# Patient Record
Sex: FEMALE | Race: WHITE | NOT HISPANIC OR LATINO | Employment: OTHER | URBAN - METROPOLITAN AREA
[De-identification: names, ages, dates, MRNs, and addresses within clinical notes are randomized per-mention and may not be internally consistent; named-entity substitution may affect disease eponyms.]

---

## 2022-06-14 ENCOUNTER — HOSPITAL ENCOUNTER (OUTPATIENT)
Facility: HOSPITAL | Age: 77
Setting detail: OBSERVATION
Discharge: HOME | End: 2022-06-16
Attending: EMERGENCY MEDICINE | Admitting: EMERGENCY MEDICINE
Payer: MEDICARE

## 2022-06-14 DIAGNOSIS — E87.1 HYPONATREMIA: ICD-10-CM

## 2022-06-14 DIAGNOSIS — E83.51 HYPOCALCEMIA: ICD-10-CM

## 2022-06-14 DIAGNOSIS — R42 VERTIGO: ICD-10-CM

## 2022-06-14 PROCEDURE — 99285 EMERGENCY DEPT VISIT HI MDM: CPT | Mod: 25

## 2022-06-14 PROCEDURE — 96375 TX/PRO/DX INJ NEW DRUG ADDON: CPT

## 2022-06-14 PROCEDURE — C9803 HOPD COVID-19 SPEC COLLECT: HCPCS

## 2022-06-14 PROCEDURE — 96361 HYDRATE IV INFUSION ADD-ON: CPT

## 2022-06-14 PROCEDURE — 96374 THER/PROPH/DIAG INJ IV PUSH: CPT

## 2022-06-15 ENCOUNTER — APPOINTMENT (OUTPATIENT)
Dept: MRI IMAGING | Facility: HOSPITAL | Age: 77
End: 2022-06-15
Attending: EMERGENCY MEDICINE
Payer: MEDICARE

## 2022-06-15 ENCOUNTER — APPOINTMENT (OUTPATIENT)
Dept: PHYSICAL THERAPY | Facility: HOSPITAL | Age: 77
End: 2022-06-15
Attending: EMERGENCY MEDICINE
Payer: MEDICARE

## 2022-06-15 PROBLEM — E87.1 HYPONATREMIA: Status: ACTIVE | Noted: 2022-06-15

## 2022-06-15 PROBLEM — E83.51 HYPOCALCEMIA: Status: ACTIVE | Noted: 2022-06-15

## 2022-06-15 PROBLEM — R42 VERTIGO: Status: ACTIVE | Noted: 2022-06-15

## 2022-06-15 LAB
ANION GAP SERPL CALCULATED.3IONS-SCNC: 4 MMOL/L (ref 5–18)
ANION GAP SERPL CALCULATED.3IONS-SCNC: 9 MMOL/L (ref 5–18)
ATRIAL RATE - MUSE: 67 BPM
BUN SERPL-MCNC: 10 MG/DL (ref 8–28)
BUN SERPL-MCNC: 11 MG/DL (ref 8–28)
CALCIUM SERPL-MCNC: 8.3 MG/DL (ref 8.5–10.5)
CALCIUM SERPL-MCNC: 9.4 MG/DL (ref 8.5–10.5)
CHLORIDE BLD-SCNC: 100 MMOL/L (ref 98–107)
CHLORIDE BLD-SCNC: 95 MMOL/L (ref 98–107)
CO2 SERPL-SCNC: 25 MMOL/L (ref 22–31)
CO2 SERPL-SCNC: 29 MMOL/L (ref 22–31)
CREAT SERPL-MCNC: 0.67 MG/DL (ref 0.6–1.1)
CREAT SERPL-MCNC: 0.7 MG/DL (ref 0.6–1.1)
DIASTOLIC BLOOD PRESSURE - MUSE: 88 MMHG
ERYTHROCYTE [DISTWIDTH] IN BLOOD BY AUTOMATED COUNT: 12 % (ref 10–15)
GFR SERPL CREATININE-BSD FRML MDRD: 89 ML/MIN/1.73M2
GFR SERPL CREATININE-BSD FRML MDRD: 90 ML/MIN/1.73M2
GLUCOSE BLD-MCNC: 101 MG/DL (ref 70–125)
GLUCOSE BLD-MCNC: 133 MG/DL (ref 70–125)
HCT VFR BLD AUTO: 39.3 % (ref 35–47)
HGB BLD-MCNC: 13.4 G/DL (ref 11.7–15.7)
INTERPRETATION ECG - MUSE: NORMAL
MAGNESIUM SERPL-MCNC: 1.8 MG/DL (ref 1.8–2.6)
MCH RBC QN AUTO: 29.8 PG (ref 26.5–33)
MCHC RBC AUTO-ENTMCNC: 34.1 G/DL (ref 31.5–36.5)
MCV RBC AUTO: 87 FL (ref 78–100)
P AXIS - MUSE: 63 DEGREES
PLATELET # BLD AUTO: 220 10E3/UL (ref 150–450)
POTASSIUM BLD-SCNC: 3.9 MMOL/L (ref 3.5–5)
POTASSIUM BLD-SCNC: 3.9 MMOL/L (ref 3.5–5)
PR INTERVAL - MUSE: 148 MS
QRS DURATION - MUSE: 78 MS
QT - MUSE: 430 MS
QTC - MUSE: 454 MS
R AXIS - MUSE: 66 DEGREES
RBC # BLD AUTO: 4.5 10E6/UL (ref 3.8–5.2)
SARS-COV-2 RNA RESP QL NAA+PROBE: NEGATIVE
SODIUM SERPL-SCNC: 129 MMOL/L (ref 136–145)
SODIUM SERPL-SCNC: 133 MMOL/L (ref 136–145)
SYSTOLIC BLOOD PRESSURE - MUSE: 157 MMHG
T AXIS - MUSE: 63 DEGREES
VENTRICULAR RATE- MUSE: 67 BPM
WBC # BLD AUTO: 6.7 10E3/UL (ref 4–11)

## 2022-06-15 PROCEDURE — 87635 SARS-COV-2 COVID-19 AMP PRB: CPT | Performed by: EMERGENCY MEDICINE

## 2022-06-15 PROCEDURE — 258N000003 HC RX IP 258 OP 636: Performed by: EMERGENCY MEDICINE

## 2022-06-15 PROCEDURE — 36415 COLL VENOUS BLD VENIPUNCTURE: CPT | Performed by: EMERGENCY MEDICINE

## 2022-06-15 PROCEDURE — 97116 GAIT TRAINING THERAPY: CPT | Mod: GP

## 2022-06-15 PROCEDURE — 80048 BASIC METABOLIC PNL TOTAL CA: CPT | Performed by: EMERGENCY MEDICINE

## 2022-06-15 PROCEDURE — 250N000013 HC RX MED GY IP 250 OP 250 PS 637: Performed by: EMERGENCY MEDICINE

## 2022-06-15 PROCEDURE — 93005 ELECTROCARDIOGRAM TRACING: CPT | Performed by: EMERGENCY MEDICINE

## 2022-06-15 PROCEDURE — 250N000011 HC RX IP 250 OP 636: Performed by: EMERGENCY MEDICINE

## 2022-06-15 PROCEDURE — 97530 THERAPEUTIC ACTIVITIES: CPT | Mod: GP

## 2022-06-15 PROCEDURE — G0378 HOSPITAL OBSERVATION PER HR: HCPCS

## 2022-06-15 PROCEDURE — 97162 PT EVAL MOD COMPLEX 30 MIN: CPT | Mod: GP

## 2022-06-15 PROCEDURE — 96372 THER/PROPH/DIAG INJ SC/IM: CPT | Performed by: EMERGENCY MEDICINE

## 2022-06-15 PROCEDURE — 83735 ASSAY OF MAGNESIUM: CPT | Performed by: EMERGENCY MEDICINE

## 2022-06-15 PROCEDURE — 70551 MRI BRAIN STEM W/O DYE: CPT

## 2022-06-15 PROCEDURE — 99220 PR INITIAL OBSERVATION CARE,LEVEL III: CPT | Performed by: EMERGENCY MEDICINE

## 2022-06-15 PROCEDURE — 85027 COMPLETE CBC AUTOMATED: CPT | Performed by: EMERGENCY MEDICINE

## 2022-06-15 RX ORDER — POLYETHYLENE GLYCOL 3350 17 G/17G
1 POWDER, FOR SOLUTION ORAL DAILY
COMMUNITY

## 2022-06-15 RX ORDER — AMOXICILLIN 250 MG
1 CAPSULE ORAL 2 TIMES DAILY PRN
Status: DISCONTINUED | OUTPATIENT
Start: 2022-06-15 | End: 2022-06-16 | Stop reason: HOSPADM

## 2022-06-15 RX ORDER — ENOXAPARIN SODIUM 100 MG/ML
40 INJECTION SUBCUTANEOUS EVERY 24 HOURS
Status: DISCONTINUED | OUTPATIENT
Start: 2022-06-15 | End: 2022-06-16 | Stop reason: HOSPADM

## 2022-06-15 RX ORDER — LIDOCAINE 40 MG/G
CREAM TOPICAL
Status: DISCONTINUED | OUTPATIENT
Start: 2022-06-15 | End: 2022-06-16 | Stop reason: HOSPADM

## 2022-06-15 RX ORDER — ONDANSETRON 2 MG/ML
4 INJECTION INTRAMUSCULAR; INTRAVENOUS EVERY 6 HOURS PRN
Status: DISCONTINUED | OUTPATIENT
Start: 2022-06-15 | End: 2022-06-16 | Stop reason: HOSPADM

## 2022-06-15 RX ORDER — ATORVASTATIN CALCIUM 40 MG/1
40 TABLET, FILM COATED ORAL AT BEDTIME
Status: DISCONTINUED | OUTPATIENT
Start: 2022-06-15 | End: 2022-06-16 | Stop reason: HOSPADM

## 2022-06-15 RX ORDER — BUSPIRONE HYDROCHLORIDE 5 MG/1
10 TABLET ORAL 2 TIMES DAILY
COMMUNITY

## 2022-06-15 RX ORDER — ONDANSETRON 4 MG/1
4 TABLET, ORALLY DISINTEGRATING ORAL EVERY 6 HOURS PRN
Status: DISCONTINUED | OUTPATIENT
Start: 2022-06-15 | End: 2022-06-16 | Stop reason: HOSPADM

## 2022-06-15 RX ORDER — CALCIUM CARBONATE 500(1250)
500 TABLET ORAL ONCE
Status: COMPLETED | OUTPATIENT
Start: 2022-06-15 | End: 2022-06-15

## 2022-06-15 RX ORDER — SODIUM CHLORIDE 9 MG/ML
INJECTION, SOLUTION INTRAVENOUS CONTINUOUS
Status: ACTIVE | OUTPATIENT
Start: 2022-06-15 | End: 2022-06-15

## 2022-06-15 RX ORDER — POLYETHYLENE GLYCOL 3350 17 G/17G
17 POWDER, FOR SOLUTION ORAL EVERY EVENING
Status: DISCONTINUED | OUTPATIENT
Start: 2022-06-15 | End: 2022-06-16 | Stop reason: HOSPADM

## 2022-06-15 RX ORDER — ACETAMINOPHEN 325 MG/1
650 TABLET ORAL EVERY 6 HOURS PRN
COMMUNITY

## 2022-06-15 RX ORDER — MECLIZINE HYDROCHLORIDE 25 MG/1
25 TABLET ORAL EVERY 6 HOURS PRN
Status: DISCONTINUED | OUTPATIENT
Start: 2022-06-15 | End: 2022-06-16 | Stop reason: HOSPADM

## 2022-06-15 RX ORDER — BUSPIRONE HYDROCHLORIDE 10 MG/1
10 TABLET ORAL 2 TIMES DAILY
Status: DISCONTINUED | OUTPATIENT
Start: 2022-06-15 | End: 2022-06-16 | Stop reason: HOSPADM

## 2022-06-15 RX ORDER — ATORVASTATIN CALCIUM 40 MG/1
40 TABLET, FILM COATED ORAL AT BEDTIME
COMMUNITY

## 2022-06-15 RX ORDER — AMOXICILLIN 250 MG
2 CAPSULE ORAL 2 TIMES DAILY PRN
Status: DISCONTINUED | OUTPATIENT
Start: 2022-06-15 | End: 2022-06-16 | Stop reason: HOSPADM

## 2022-06-15 RX ORDER — SERTRALINE HYDROCHLORIDE 100 MG/1
200 TABLET, FILM COATED ORAL DAILY
COMMUNITY

## 2022-06-15 RX ORDER — MECLIZINE HCL 12.5 MG 12.5 MG/1
25 TABLET ORAL ONCE
Status: DISCONTINUED | OUTPATIENT
Start: 2022-06-15 | End: 2022-06-15

## 2022-06-15 RX ORDER — ONDANSETRON 2 MG/ML
4 INJECTION INTRAMUSCULAR; INTRAVENOUS ONCE
Status: COMPLETED | OUTPATIENT
Start: 2022-06-15 | End: 2022-06-15

## 2022-06-15 RX ORDER — DIAZEPAM 10 MG/2ML
2.5 INJECTION, SOLUTION INTRAMUSCULAR; INTRAVENOUS ONCE
Status: COMPLETED | OUTPATIENT
Start: 2022-06-15 | End: 2022-06-15

## 2022-06-15 RX ORDER — ACETAMINOPHEN 325 MG/1
650 TABLET ORAL EVERY 6 HOURS PRN
Status: DISCONTINUED | OUTPATIENT
Start: 2022-06-15 | End: 2022-06-16 | Stop reason: HOSPADM

## 2022-06-15 RX ORDER — MECLIZINE HCL 12.5 MG 12.5 MG/1
25 TABLET ORAL ONCE
Status: COMPLETED | OUTPATIENT
Start: 2022-06-15 | End: 2022-06-15

## 2022-06-15 RX ORDER — DIPHENHYDRAMINE HYDROCHLORIDE 50 MG/ML
25 INJECTION INTRAMUSCULAR; INTRAVENOUS ONCE
Status: COMPLETED | OUTPATIENT
Start: 2022-06-15 | End: 2022-06-15

## 2022-06-15 RX ADMIN — BUSPIRONE HYDROCHLORIDE 10 MG: 10 TABLET ORAL at 16:05

## 2022-06-15 RX ADMIN — ENOXAPARIN SODIUM 40 MG: 40 INJECTION SUBCUTANEOUS at 08:26

## 2022-06-15 RX ADMIN — DIAZEPAM 2.5 MG: 5 INJECTION, SOLUTION INTRAMUSCULAR; INTRAVENOUS at 00:11

## 2022-06-15 RX ADMIN — SODIUM CHLORIDE: 9 INJECTION, SOLUTION INTRAVENOUS at 08:32

## 2022-06-15 RX ADMIN — SERTRALINE HYDROCHLORIDE 200 MG: 100 TABLET ORAL at 16:05

## 2022-06-15 RX ADMIN — ACETAMINOPHEN 650 MG: 325 TABLET, FILM COATED ORAL at 22:15

## 2022-06-15 RX ADMIN — MECLIZINE 25 MG: 12.5 TABLET ORAL at 00:12

## 2022-06-15 RX ADMIN — PROMETHAZINE HYDROCHLORIDE 12.5 MG: 25 INJECTION INTRAMUSCULAR; INTRAVENOUS at 02:14

## 2022-06-15 RX ADMIN — SODIUM CHLORIDE 500 ML: 9 INJECTION, SOLUTION INTRAVENOUS at 04:22

## 2022-06-15 RX ADMIN — DIPHENHYDRAMINE HYDROCHLORIDE 25 MG: 50 INJECTION, SOLUTION INTRAMUSCULAR; INTRAVENOUS at 01:29

## 2022-06-15 RX ADMIN — Medication 500 MG: at 02:51

## 2022-06-15 RX ADMIN — BUSPIRONE HYDROCHLORIDE 10 MG: 10 TABLET ORAL at 21:47

## 2022-06-15 RX ADMIN — SODIUM CHLORIDE: 9 INJECTION, SOLUTION INTRAVENOUS at 18:55

## 2022-06-15 RX ADMIN — ATORVASTATIN CALCIUM 40 MG: 40 TABLET, FILM COATED ORAL at 21:47

## 2022-06-15 RX ADMIN — ONDANSETRON 4 MG: 2 INJECTION INTRAMUSCULAR; INTRAVENOUS at 00:12

## 2022-06-15 ASSESSMENT — ACTIVITIES OF DAILY LIVING (ADL)
ADLS_ACUITY_SCORE: 35

## 2022-06-15 ASSESSMENT — ENCOUNTER SYMPTOMS
NAUSEA: 1
DIZZINESS: 1
WEAKNESS: 0
VOMITING: 1

## 2022-06-15 NOTE — ED PROVIDER NOTES
EMERGENCY DEPARTMENT ENCOUNTER      NAME: Carisa Rosado  AGE: 77 year old female  YOB: 1945  MRN: [id number]  EVALUATION DATE & TIME: 6/14/2022 11:47 PM    PCP: Izzy Corado    ED PROVIDER: Nesha Lui M.D.      Chief Complaint   Patient presents with     Nausea     d     Dizziness         FINAL IMPRESSION:  1. Vertigo    2. Hyponatremia    3. Hypocalcemia        MEDICAL DECISION MAKING:    Pertinent Labs & Imaging studies reviewed. (See chart for details)  ED Course as of 06/15/22 0453   Wed Caleb 15, 2022   0002 Afebrile.  Vital signs here with mild hypertension, otherwise within normal limits.  Patient is presenting with vertigo.  Started 6 PM.  She states she does have a history of this in the past.  She states this episode feels identical to her previous episodes of vertigo.  Normally they are self-limiting, today it seems to have persisted over the last 6 hours.  She does not have any symptoms besides some mild nausea if she is laying straight.  Her symptoms are completely reproducible with any movement of her head.  She does have some seasonal allergies but does not take medications for it.  Has not been ill with anything lately.  No weakness or numbness in her arms or legs.  No falls or headache.  No other symptoms.    Physical exam for patient here she does have 3 beat nystagmus with far left vertical gaze.  She does not have any reproduction of vertigo with just movement of her eyes.  Turning her head to the right or left does elicit vertiginous symptoms with associated nausea.  Otherwise she does have some mild fluid located behind her bilateral tympanic membranes, slightly worse on the left than the right.  No sinus pressure.  Otherwise neurologic leave she is intact without any focal findings on neurologic exam.    This does seem consistent with peripheral vertigo given that the symptoms are worsened with movement and turning of her head from side to side.  She does have some  nystagmus as well.  Negative test of skew.  She only has unilateral nystagmus.  At this point we will treat symptomatically to see if she has improvement with just meclizine and Zofran.  Regardless her symptoms been ongoing for the last 6 hours, does not appear by exam to be completely peripheral.  Will reevaluate after medications.   0120 Patient still with persistent vertigo.  Blood pressures come down, they reported her respiration rate is elevated however this is not, the case.  She is still feeling nauseated.  To try to get up to go to the bathroom and still feeling very dizzy despite medications given an hour ago.  Still does seem to be positional.  However given the persistence despite medications we will proceed with MRI, check basic labs for patient here as well.  We will give an additional 25 of meclizine.   0308 Labs back here with slight hyponatremia, calcium is low, given some fluids, will replenish calcium.  Still awaiting MRI.  Patient resting at this time.       MRI here unremarkable.  Patient has been able to get up and walk to the bathroom however she states she still feels vertiginous.  She is not reporting any improvement in her symptoms despite multiple doses of medications.  She does appear to be tired here but is still complaining of symptoms.  Still has occasional nausea.  Is gotten multiple IV and oral medications as well as multiple antinausea medications.  At this point we will admit patient for ongoing vertigo.      Critical care: 0 minutes excluding separately billable procedures.  Includes bedside management, time reviewing test results, review of records, discussing the case with staff, documenting the medical record and time spent with family members (or surrogate decision makers) discussing specific treatment issues.        ED COURSE:  12:00 AM I met with the patient, obtained history, performed an initial exam, and discussed options and plan for diagnostics and treatment here in the  ED.  1:17 AM I rechecked patient. Patient is still feeling dizzy.    The importance of close follow up was discussed. We reviewed warning signs and symptoms, and I instructed Ms. Rosado to return to the emergency department immediately if she develops any new or worsening symptoms. I provided additional verbal discharge instructions. Ms. Rosado expressed understanding and agreement with this plan of care, her questions were answered, and she was discharged in stable condition.     MEDICATIONS GIVEN IN THE EMERGENCY:  Medications   promethazine (PHENERGAN) 12.5 mg in sodium chloride 0.9 % 50 mL intermittent infusion (12.5 mg Intravenous Given 6/15/22 0214)   meclizine (ANTIVERT) tablet 25 mg (25 mg Oral Given 6/15/22 0012)   diazepam (VALIUM) injection 2.5 mg (2.5 mg Intravenous Given 6/15/22 0011)   ondansetron (ZOFRAN) injection 4 mg (4 mg Intravenous Given 6/15/22 0012)   diphenhydrAMINE (BENADRYL) injection 25 mg (25 mg Intravenous Given 6/15/22 0129)   0.9% sodium chloride BOLUS (500 mLs Intravenous New Bag 6/15/22 0422)   calcium carbonate 500 mg (elemental) (OSCAL) tablet 500 mg (500 mg Oral Given 6/15/22 0251)       NEW PRESCRIPTIONS STARTED AT TODAY'S ER VISIT:  New Prescriptions    No medications on file          =================================================================    HPI    Patient information was obtained from: Patient    Use of : N/A         Carisa Rosado is a 77 year old female with a history of hypercholesterolemia, chronic GERD, gastroenteritis, BCC, who presents to the ED via EMS for the evaluation of nausea and dizziness.    Patient reports nausea and dizziness when getting up starting at 1800 last night. She states that when she is lying down with her eyes closed, she does not have any dizziness but does feel nauseous. Patient reports a history of vertigo and states that current symptoms are reminiscent. Otherwise, she denies any ear pain, ear congestion, and weakness.  Patient does not follow with ENT. No other complaints at this time.    Per triage note, patient vomited twice. Patient was given IV fluids and 4 mg IV Zofran per EMS.     REVIEW OF SYSTEMS   Review of Systems   HENT: Negative for ear pain.         Negative for congestion in ears   Gastrointestinal: Positive for nausea and vomiting.   Neurological: Positive for dizziness. Negative for weakness.   All other systems reviewed and are negative.    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       PAST SURGICAL HISTORY:  Past Surgical History:   Procedure Laterality Date     COLONOSCOPY N/A 10/27/2014    Procedure: COLONOSCOPY;  Surgeon: Mark Amaral MD;  Location: [facility]     GYN SURGERY       HYSTERECTOMY TOTAL ABDOMINAL       TUBAL LIGATION         CURRENT MEDICATIONS:      Current Facility-Administered Medications:      promethazine (PHENERGAN) 12.5 mg in sodium chloride 0.9 % 50 mL intermittent infusion, 12.5 mg, Intravenous, Q6H PRN, Oralia Lui MD, 12.5 mg at 06/15/22 0214    Current Outpatient Medications:      ASPIRIN 81 MG OR TABS, 1 TABLET DAILY, Disp: 30, Rfl: 0     CALCIUM-CARB 600 + D OR, 1 TABLET ORALLY DAILY, Disp: , Rfl:      Citalopram Hydrobromide (CELEXA PO), Take 40 mg by mouth daily , Disp: , Rfl:      Ranitidine HCl (ZANTAC PO), Take 150 mg by mouth daily , Disp: , Rfl:      Simvastatin (ZOCOR PO), Take 20 mg by mouth daily , Disp: , Rfl:     ALLERGIES:  Allergies   Allergen Reactions     Pcn [Penicillins] Hives       FAMILY HISTORY:  Family History   Problem Relation Age of Onset     Cancer Mother      Breast Cancer Mother      Cancer Father         lung     Neurologic Disorder Maternal Grandmother         parkinsons     Depression Sister      Alcohol/Drug Son      Depression Son      Respiratory  "Daughter         asthma     Alcohol/Drug Daughter      Depression Daughter      Alcohol/Drug Daughter      Depression Daughter      Neurologic Disorder Other         epilepsy       SOCIAL HISTORY:   Social History     Socioeconomic History     Marital status:      Spouse name: None     Number of children: None     Years of education: None     Highest education level: None   Tobacco Use     Smoking status: Former Smoker     Quit date: 3/14/1970     Years since quittin.2     Smokeless tobacco: Never Used   Substance and Sexual Activity     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     Drug use: No     Sexual activity: Yes     Partners: Male       PHYSICAL EXAM:    Vitals: BP (!) 141/69   Pulse 67   Temp 98  F (36.7  C)   Resp 16   Ht 1.575 m (5' 2\")   Wt 53.1 kg (117 lb)   SpO2 100%   BMI 21.40 kg/m     General:. Alert and interactive, comfortable appearing.  HENT: Oropharynx without erythema or exudates. MMM. Turning head to the right or left does elicit vertiginous symptoms with associating nausea. Mild fluid located behind bilateral tympanic membranes, slightly worse on the left than right. No sinus pressure.  Eyes: Pupils mid-sized and equally reactive. 3 beat nystagmus with far left vertical gaze. No reproduction of vertigo with just movement of eyes.   Neck: Full AROM.  No midline tenderness to palpation.  Cardiovascular: Regular rate and rhythm. Peripheral pulses 2+ bilaterally.  Chest/Pulmonary: Normal work of breathing. Lung sounds clear and equal throughout, no wheezes or crackles. No chest wall tenderness or deformities.  Abdomen: Soft, nondistended. Nontender without guarding or rebound.  Back/Spine: No CVA or midline tenderness.  Extremities: Normal ROM of all major joints. No lower extremity edema.   Skin: Warm and dry. Normal skin color.   Neuro: Speech clear. CNs grossly intact. Moves all extremities appropriately. Strength and sensation grossly intact to all extremities.   Psych: " Normal affect/mood, cooperative, memory appropriate.     LAB:  All pertinent labs reviewed and interpreted.  Labs Ordered and Resulted from Time of ED Arrival to Time of ED Departure   BASIC METABOLIC PANEL - Abnormal       Result Value    Sodium 129 (*)     Potassium 3.9      Chloride 95 (*)     Carbon Dioxide (CO2) 25      Anion Gap 9      Urea Nitrogen 11      Creatinine 0.70      Calcium 8.3 (*)     Glucose 133 (*)     GFR Estimate 89     CBC WITH PLATELETS - Normal    WBC Count 6.7      RBC Count 4.50      Hemoglobin 13.4      Hematocrit 39.3      MCV 87      MCH 29.8      MCHC 34.1      RDW 12.0      Platelet Count 220     MAGNESIUM - Normal    Magnesium 1.8         RADIOLOGY:  MR Brain w/o Contrast   Final Result   IMPRESSION:   1.  No acute intracranial process.   2.  Mild sequelae of chronic microvascular ischemic disease, similar to the prior exam.          EKG:  See MDM  I have independently reviewed and interpreted the EKG(s) documented above.       I, Sabrina Giordano, am serving as a scribe to document services personally performed by Dr. Nesha Lui  based on my observation and the provider's statements to me. I, Nesha Lui MD attest that Sabrina Giordano is acting in a scribe capacity, has observed my performance of the services and has documented them in accordance with my direction.      Nesha Lui M.D.  Emergency Medicine  [facility] EMERGENCY DEPARTMENT  [facility] [facility] [address]  [phone]  Dept: [phone]     Oralia Lui MD  06/15/22 0459

## 2022-06-15 NOTE — PROGRESS NOTES
Physical Therapy     No clinical signs of BPPV on exam. Pt's symptoms are most consistent with vestibular neuritis or labyrinthitis on her left side. She has been experiencing fullness/stuffiness of the left ear, increased allergy symptoms, and post-nasal drainage. She denies taking any allergy medications or decongestants. Pt and spouse educated on etiology of vertigo, possible treatments (deferred to her doctor) such as decongestants or steroids, and long term follow up option of referral to ENT. Outpatient vestibular rehab is recommended if pt's symptoms do not resolve fully.  Pt is safe to return home with spouse if issued a FWW.        06/15/22 0957   Quick Adds   Quick Adds Certification;Vestibular Eval   Type of Visit Initial PT Evaluation   Living Environment   People in Home spouse   Current Living Arrangements house   Home Accessibility stairs to enter home;stairs within home   Number of Stairs, Main Entrance none   Number of Stairs, Within Home, Primary six   Stair Railings, Within Home, Primary railings safe and in good condition   Transportation Anticipated family or friend will provide   Self-Care   Usual Activity Tolerance excellent   Current Activity Tolerance good   Regular Exercise Yes   Activity/Exercise Type walking;strength training   Exercise Amount/Frequency greater than 1 hr;daily   Equipment Currently Used at Home none   Fall history within last six months no   Activity/Exercise/Self-Care Comment independent with all ADLs/IADLs   General Information   Onset of Illness/Injury or Date of Surgery 06/14/22   Referring Physician Kevin Garay MD   Patient/Family Therapy Goals Statement (PT) resolve dizziness   Pertinent History of Current Problem (include personal factors and/or comorbidities that impact the POC) acute onset/exacerbation of recurrent vertigo   Existing Precautions/Restrictions fall   Cognition   Affect/Mental Status (Cognition) WFL   Pain Assessment   Patient Currently  in Pain No   Integumentary/Edema   Integumentary/Edema no deficits were identifed   Posture    Posture Not impaired   Range of Motion (ROM)   Range of Motion ROM is WNL   Strength (Manual Muscle Testing)   Strength (Manual Muscle Testing) No deficits observed during functional mobility   Bed Mobility   Comment, (Bed Mobility) supine<>sit SBA   Transfers   Comment, (Transfers) sit to stand CGA   Gait/Stairs (Locomotion)   University Level (Gait) minimum assist (75% patient effort)   Assistive Device (Gait)   (none)   Distance in Feet (Required for LE Total Joints) 80'x2   Pattern (Gait) 2-point;swing-through   Comment, (Gait/Stairs) path and speed deviation, which increased with head turns or distractions   Balance   Balance Comments impaired 2/2 vertigo   Sensory Examination   Sensory Perception patient reports no sensory changes   Coordination   Coordination no deficits were identified   Muscle Tone   Muscle Tone no deficits were identified   Cervicogenic Screen   Neck ROM WNL   Infrared Goggle Exam or Frenzel Lense Exam   Exam completed with Room Light   Gaze Evoked Nystagmus Horizontal L   Positional testing Negative   Jessica-Hallpike (Right) Negative   Rosamond-Hallpike (Right) Comments symptoms present on the way back to upright sitting (no nystagmus)   Rosamond-Hallpike (Left) Negative   Rosamond-Hallpike (Left) Comments symptoms present on the way back to upright sitting (no nystagmus)   [facility] Supine Roll Test (Right) Negative   [facility] Supine Roll Test (Left) Negative   Clinical Impression   Criteria for Skilled Therapeutic Intervention Yes, treatment indicated   PT Diagnosis (PT) gait instability   Influenced by the following impairments impaired balance 2/2 vertigo   Functional limitations due to impairments limited household and community mobility; fall risk   Clinical Presentation (PT Evaluation Complexity) Stable/Uncomplicated   Clinical Presentation Rationale presents as medically diagnosed   Clinical Decision Making  (Complexity) moderate complexity   Planned Therapy Interventions (PT) balance training;bed mobility training;gait training;home exercise program;neuromuscular re-education;patient/family education;stair training;transfer training   Anticipated Equipment Needs at Discharge (PT) walker, rolling   Risk & Benefits of therapy have been explained evaluation/treatment results reviewed;care plan/treatment goals reviewed;participants voiced agreement with care plan;participants included;patient;spouse/significant other   Clinical Impression Comments (S)  vertigo is product of inflammation, not BPPV. left side labyrinthitis or vestibular neuritis   PT Discharge Planning   PT Discharge Recommendation (DC Rec) home with outpatient physical therapy   PT Rationale for DC Rec has support from spouse, safe to d/c home with FWW if symptoms have not resolved   Therapy Certification   Start of care date 06/15/22   Certification date from 06/15/22   Certification date to 06/21/22   Medical Diagnosis vertigo   Total Evaluation Time   Total Evaluation Time (Minutes) 15   Physical Therapy Goals   PT Frequency Daily   PT Predicted Duration/Target Date for Goal Attainment 06/21/22   PT Goals Bed Mobility;Transfers;Gait;Stairs   PT: Bed Mobility Independent;Supine to/from sit;Rolling   PT: Transfers Independent;Sit to/from stand   PT: Gait Independent;Greater than 200 feet   PT: Stairs Supervision/stand-by assist;6 stairs;Rail on left       Pt was educated on the role of physical therapy in their care, and indicated understanding.      Mee Christy, DPT 6/15/2022

## 2022-06-15 NOTE — CONSULTS
Care Management Initial Consult    General Information  Assessment completed with: Patient, Spouse or significant other, pt, spouse Rodrigo  Type of CM/SW Visit: Initial Assessment    Primary Care Provider verified and updated as needed: Yes   Readmission within the last 30 days: no previous admission in last 30 days   Return Category: Progression of disease  Reason for Consult: discharge planning  Advance Care Planning: Advance Care Planning Reviewed: verified with patient          Communication Assessment  Patient's communication style: spoken language (English or Bilingual)             Cognitive  Cognitive/Neuro/Behavioral: WDL                      Living Environment:   People in home: alone, spouse     Current living Arrangements: house      Able to return to prior arrangements: yes       Family/Social Support:  Care provided by: self  Provides care for: no one  Marital Status:             Description of Support System: Supportive, Involved    Support Assessment: Adequate family and caregiver support, Adequate social supports    Current Resources:   Patient receiving home care services: No     Community Resources: None  Equipment currently used at home: none  Supplies currently used at home: None    Employment/Financial:  Employment Status:          Financial Concerns: No concerns identified   Referral to Financial Worker: No       Lifestyle & Psychosocial Needs:  Social Determinants of Health     Tobacco Use: Medium Risk     Smoking Tobacco Use: Former Smoker     Smokeless Tobacco Use: Never Used   Alcohol Use: Not on file   Financial Resource Strain: Not on file   Food Insecurity: Not on file   Transportation Needs: Not on file   Physical Activity: Not on file   Stress: Not on file   Social Connections: Not on file   Intimate Partner Violence: Not on file   Depression: Not on file   Housing Stability: Not on file       Functional Status:  Prior to admission patient needed assistance:               Mental Health Status:          Chemical Dependency Status:                Values/Beliefs:  Spiritual, Cultural Beliefs, Mu-ism Practices, Values that affect care:                 Additional Information:  Assessed, lives w/spouse, Rodrigo and he will transport, pt is independent at baseline and no svcs. Discussed [facility].      Katerine Murcia RN

## 2022-06-15 NOTE — PROGRESS NOTES
[facility]      OUTPATIENT PHYSICAL THERAPY EVALUATION  PLAN OF TREATMENT FOR OUTPATIENT REHABILITATION  (COMPLETE FOR INITIAL CLAIMS ONLY)  Patient's Last Name, First Name, M.I.  YOB: 1945  Carisa Rosado                        Provider's Name  [facility] Medical Record No.  [id number]                               Onset Date:  06/14/22   Start of Care Date:  (P) 06/15/22      Type:     _X_PT   ___OT   ___SLP Medical Diagnosis:  (P) vertigo                        PT Diagnosis:  (P) gait instability   Visits from SOC:  1   _________________________________________________________________________________  Plan of Treatment/Functional Goals    Planned Interventions: (P) balance training, bed mobility training, gait training, home exercise program, neuromuscular re-education, patient/family education, stair training, transfer training     Goals: See Physical Therapy Goals on Care Plan in [facility] electronic health record.    Therapy Frequency: (P) Daily  Predicted Duration of Therapy Intervention: (P) 06/21/22  _________________________________________________________________________________    I CERTIFY THE NEED FOR THESE SERVICES FURNISHED UNDER        THIS PLAN OF TREATMENT AND WHILE UNDER MY CARE     (Physician co-signature of this document indicates review and certification of the therapy plan).              Certification date from: (P) 06/15/22, Certification date to: (P) 06/21/22    Referring Physician: (P) Kevin Garay MD            Initial Assessment        See Physical Therapy evaluation dated (P) 06/15/22 in Epic electronic health record.

## 2022-06-15 NOTE — H&P
ADMISSION HISTORY & PHYSICAL      Izzy Corado, [phone]  ASSESSMENT AND PLAN:  77 year old female presenting with:    1.  Vertigo: Symptoms seem most consistent with peripheral etiology, MRI is negative.  She also appears volume depleted, her mouth is very dry.  Had 2 episodes of emesis but nausea has improved with medications.  We will start IV fluids, meclizine and PT consult.  Will monitor neurochecks for today.    2.  Mild hyponatremia: She may be on an SSRI, awaiting med rec.  Will trend.    Barriers to discharge: Inability to ambulate secondary to vertigo, dehydration requiring IV fluids.      CHIEF COMPLAINT:  Vertigo    HISTORY OF PRESENTING ILLNESS:  Carisa Rosado is a 77 year old female presented with vertigo and nausea and vomiting.  It started last evening around 6 PM after she was doing yoga and she sat up quickly.  She had severe vertigo and the only modifying factor was to remain completely still.  It was much worse with movement.  She has had symptoms like this in the past on multiple occasions but not this bad.  Her mouth feels very dry and she is not eating anything since yesterday.  No fevers or chills, no cough, no cold symptoms, no abdominal pain, no chest pain or shortness of breath.    PMH/PSH:  Patient Active Problem List   Diagnosis     Gastroenteritis     Hypocalcemia     Hyponatremia     Vertigo       ALLERGIES:  Allergies   Allergen Reactions     Pcn [Penicillins] Hives       MEDICATIONS:  Reviewed.  Current Facility-Administered Medications   Medication     enoxaparin ANTICOAGULANT (LOVENOX) injection 40 mg     lidocaine (LMX4) cream     lidocaine 1 % 0.1-1 mL     meclizine chew tablet 25 mg     melatonin tablet 1 mg     ondansetron (ZOFRAN ODT) ODT tab 4 mg    Or     ondansetron (ZOFRAN) injection 4 mg     promethazine (PHENERGAN) 12.5 mg in sodium chloride 0.9 % 50 mL intermittent infusion     senna-docusate (SENOKOT-S/PERICOLACE) 8.6-50 MG per tablet 1 tablet    Or      senna-docusate (SENOKOT-S/PERICOLACE) 8.6-50 MG per tablet 2 tablet     sodium chloride (PF) 0.9% PF flush 3 mL     sodium chloride (PF) 0.9% PF flush 3 mL     sodium chloride 0.9% infusion     Current Outpatient Medications   Medication     ASPIRIN 81 MG OR TABS     CALCIUM-CARB 600 + D OR     Citalopram Hydrobromide (CELEXA PO)     Ranitidine HCl (ZANTAC PO)     Simvastatin (ZOCOR PO)       SOCIAL HISTORY:  Social History     Socioeconomic History     Marital status:      Spouse name: Not on file     Number of children: Not on file     Years of education: Not on file     Highest education level: Not on file   Occupational History     Not on file   Tobacco Use     Smoking status: Former Smoker     Quit date: 3/14/1970     Years since quittin.2     Smokeless tobacco: Never Used   Substance and Sexual Activity     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     Drug use: No     Sexual activity: Yes     Partners: Male   Other Topics Concern     Not on file   Social History Narrative     Not on file     Social Determinants of Health     Financial Resource Strain: Not on file   Food Insecurity: Not on file   Transportation Needs: Not on file   Physical Activity: Not on file   Stress: Not on file   Social Connections: Not on file   Intimate Partner Violence: Not on file   Housing Stability: Not on file       FAMILY HISTORY:  Family History   Problem Relation Age of Onset     Cancer Mother      Breast Cancer Mother      Cancer Father         lung     Neurologic Disorder Maternal Grandmother         parkinsons     Depression Sister      Alcohol/Drug Son      Depression Son      Respiratory Daughter         asthma     Alcohol/Drug Daughter      Depression Daughter      Alcohol/Drug Daughter      Depression Daughter      Neurologic Disorder Other         epilepsy       ROS:  12 point ROS was performed and found to be negative except for the pertinent positives mentioned in the HPI.      PHYSICAL EXAM:  BP (!)  "144/68   Pulse 61   Temp 98  F (36.7  C)   Resp 20   Ht 1.575 m (5' 2\")   Wt 53.1 kg (117 lb)   SpO2 93%   BMI 21.40 kg/m    No intake/output data recorded.  No intake/output data recorded.  CONSTITUTIONAL: No apparent distress  HEENT:       Sclera- anicteric      Mucous membrane-dry  LUNGS: Clear to auscultation bilaterally  CARDIOVASCULAR: S1S2 regular. No murmurs, rubs or gallops,no pedal edema  GI: Soft. Non-tender, non-distended. No organomegaly. No guarding or rigidity. Bowel sounds- active  NEURO: Cranial nerves II-XII grossly intact. No focal neurological deficit. No tremor, mild nystagmus noted  INTGM: No skin rash, no cyanosis or clubbing  LYMPH: no adenopathy  MSK: no joint swelling or tenderness  PSYCH: alert and oriented x 3, no agitation      DIAGNOSTIC DATA:  Recent Results (from the past 24 hour(s))   ECG 12-LEAD WITH MUSE (LHE)    Collection Time: 06/15/22 12:05 AM   Result Value Ref Range    Systolic Blood Pressure 157 mmHg    Diastolic Blood Pressure 88 mmHg    Ventricular Rate 67 BPM    Atrial Rate 67 BPM    OR Interval 148 ms    QRS Duration 78 ms     ms    QTc 454 ms    P Axis 63 degrees    R AXIS 66 degrees    T Axis 63 degrees    Interpretation ECG       Sinus rhythm  Normal ECG  When compared with ECG of 14-JUN-2022 23:53,  Premature atrial complexes are no longer Present  Confirmed by SEE ED PROVIDER NOTE FOR, ECG INTERPRETATION ([id number]),  HIEN FRANCES ([id number]) on 6/15/2022 7:07:10 AM     CBC (+ platelets, no diff)    Collection Time: 06/15/22  1:29 AM   Result Value Ref Range    WBC Count 6.7 4.0 - 11.0 10e3/uL    RBC Count 4.50 3.80 - 5.20 10e6/uL    Hemoglobin 13.4 11.7 - 15.7 g/dL    Hematocrit 39.3 35.0 - 47.0 %    MCV 87 78 - 100 fL    MCH 29.8 26.5 - 33.0 pg    MCHC 34.1 31.5 - 36.5 g/dL    RDW 12.0 10.0 - 15.0 %    Platelet Count 220 150 - 450 10e3/uL   Basic metabolic panel    Collection Time: 06/15/22  1:29 AM   Result Value Ref Range    Sodium 129 (L) 136 - 145 " mmol/L    Potassium 3.9 3.5 - 5.0 mmol/L    Chloride 95 (L) 98 - 107 mmol/L    Carbon Dioxide (CO2) 25 22 - 31 mmol/L    Anion Gap 9 5 - 18 mmol/L    Urea Nitrogen 11 8 - 28 mg/dL    Creatinine 0.70 0.60 - 1.10 mg/dL    Calcium 8.3 (L) 8.5 - 10.5 mg/dL    Glucose 133 (H) 70 - 125 mg/dL    GFR Estimate 89 >60 mL/min/1.73m2   Magnesium    Collection Time: 06/15/22  1:29 AM   Result Value Ref Range    Magnesium 1.8 1.8 - 2.6 mg/dL     All lab studies reviewed personally    MR Brain w/o Contrast    Result Date: 6/15/2022  EXAM: MR BRAIN W/O CONTRAST LOCATION: [facility] DATE/TIME: 6/15/2022 3:26 AM INDICATION: Vertigo COMPARISON: 01/29/2020 TECHNIQUE: Routine multiplanar multisequence head MRI without intravenous contrast. FINDINGS: INTRACRANIAL CONTENTS: No acute or subacute infarct. No mass, acute hemorrhage, or extra-axial fluid collections. Scattered nonspecific T2/FLAIR hyperintensities within the cerebral white matter most consistent with minor chronic microvascular ischemic change. Normal ventricles and sulci for age. Normal position of the cerebellar tonsils. SELLA: No abnormality accounting for technique. OSSEOUS STRUCTURES/SOFT TISSUES: Normal marrow signal. The major intracranial vascular flow voids are maintained. ORBITS: No abnormality accounting for technique. SINUSES/MASTOIDS: Mucosal thickening primarily involving the ethmoid air cells. Polypoid mucosal thickening along the floors of maxillary sinuses. No middle ear or mastoid effusion.     IMPRESSION: 1.  No acute intracranial process. 2.  Mild sequelae of chronic microvascular ischemic disease, similar to the prior exam.    Radiology report reviewed.        Gerard Garay MD  [facility] Medicine [facility]

## 2022-06-15 NOTE — UTILIZATION REVIEW
"Admission Status; Secondary Review Determination         Under the authority of the Utilization Management Committee, the utilization review process indicated a secondary review on the above patient.  The review outcome is based on review of the medical records, discussions with staff, and applying clinical experience noted on the date of the review.          (x) Observation Status Appropriate - This patient does not meet hospital inpatient criteria and is placed in observation status. If this patient's primary payer is Medicare and was admitted as an inpatient, Condition Code 44 should be used and patient status changed to \"observation\".       RATIONALE FOR DETERMINATION     Ms. Rosado is a 76 yo female who presents to the ED with dizziness and nausea/vomiting that was worse with movement.  She has previous episodes of vertigo.  MRI of the brain negative for acute findings.  She has mild hyponatremia that has improved with IVF.      The severity of illness, intensity of service provided, expected LOS and risk for adverse outcome make the care appropriate for further observation; however, doesn't meet criteria for hospital inpatient admission. Dr Garay notified of this determination and is in agreement.        The information on this document is developed by the utilization review team in order for the business office to ensure compliance.  This only denotes the appropriateness of proper admission status and does not reflect the quality of care rendered.         The definitions of Inpatient Status and Observation Status used in making the determination above are those provided in the CMS Coverage Manual, Chapter 1 and Chapter 6, section 70.4.        Sincerely,    Irene Seth, DO  Utilization Review  Physician Advisor  [facility]  "

## 2022-06-15 NOTE — ED TRIAGE NOTES
Pt presents with [facility] from home. Complaints of nausea and dizziness when standing. Vomited X2. EMS started PIV and IV fluids, gave 4 mg IV Zofran. 12 lead revealed multiple PVCs, pt is mildly hypertensive.      Triage Assessment     Row Name 06/14/22 9969       Triage Assessment (Adult)    Airway WDL WDL       Respiratory WDL    Respiratory WDL WDL       Skin Circulation/Temperature WDL    Skin Circulation/Temperature WDL WDL       Cardiac WDL    Cardiac WDL X;rhythm    Cardiac Rhythm other (see comments)  PVCs       Peripheral/Neurovascular WDL    Peripheral Neurovascular WDL WDL       Cognitive/Neuro/Behavioral WDL    Cognitive/Neuro/Behavioral WDL WDL

## 2022-06-15 NOTE — PLAN OF CARE
"PRIMARY DIAGNOSIS: \"GENERIC\" NURSING  OUTPATIENT/OBSERVATION GOALS TO BE MET BEFORE DISCHARGE:  1. ADLs back to baseline: Yes    2. Activity and level of assistance: Up with standby assistance.    3. Pain status: Pain free.    4. Return to near baseline physical activity: Yes     Discharge Planner Nurse   Safe discharge environment identified: Yes  Barriers to discharge: Yes       Entered by: Kylee Greenberg RN 06/15/2022 2:31 PM     Please review provider order for any additional goals.   Nurse to notify provider when observation goals have been met and patient is ready for discharge.Goal Outcome Evaluation:                      "

## 2022-06-15 NOTE — PLAN OF CARE
Problem: Plan of Care - These are the overarching goals to be used throughout the patient stay.    Goal: Plan of Care Review/Shift Note  Description: The Plan of Care Review/Shift note should be completed every shift.  The Outcome Evaluation is a brief statement about your assessment that the patient is improving, declining, or no change.  This information will be displayed automatically on your shift note.  Outcome: Ongoing, Progressing   Goal Outcome Evaluation:        Pt is alert and orient x 4. Denies any pain at this time. Pt did state she had some dizziness when she stood up but became better. V.S.S. Lungs are clear and on room air. Pt tolerating a regular diet. Stand by assist with a walker to the bathroom. Continent of bowel and bladder. Will continue to monitor.

## 2022-06-15 NOTE — PHARMACY-ADMISSION MEDICATION HISTORY
Pharmacy Note - Admission Medication History     ______________________________________________________________________    Prior To Admission (PTA) med list completed and updated in EMR.       PTA Med List   Medication Sig Last Dose     acetaminophen (TYLENOL) 325 MG tablet Take 650 mg by mouth every 6 hours as needed for mild pain Past Month at Unknown time     atorvastatin (LIPITOR) 40 MG tablet Take 40 mg by mouth At Bedtime 6/14/2022 at PM     busPIRone (BUSPAR) 5 MG tablet Take 10 mg by mouth 2 times daily 6/14/2022 at PM     CALCIUM-CARB 600 + D OR Take 1 tablet by mouth daily 6/14/2022 at am     polyethylene glycol (MIRALAX) 17 g packet Take 1 packet by mouth daily Takes mid-afternoon 6/14/2022 at afternoon     sertraline (ZOLOFT) 100 MG tablet Take 200 mg by mouth daily 6/14/2022 at am       Information source(s): Patient and Clinic records  Method of interview communication: in-person    Summary of Changes to PTA Med List  New: Miralax, atorvastatin, sertraline, buspirone, acetaminophen  Discontinued: aspirin, citalopram, ranitidine, simvastatin  Changed: none    Patient was asked about OTC/herbal products specifically.  PTA med list reflects this.    In the past week, patient estimated taking medication this percent of the time:  greater than 90%.    Allergies were reviewed, assessed, and updated with the patient.      Patient does not use any multi-dose medications prior to admission.    The information provided in this note is only as accurate as the sources available at the time of the update(s).    Thank you for the opportunity to participate in the care of this patient.    Pattie Isaac RPH  6/15/2022 8:21 AM

## 2022-06-16 ENCOUNTER — APPOINTMENT (OUTPATIENT)
Dept: PHYSICAL THERAPY | Facility: HOSPITAL | Age: 77
End: 2022-06-16
Payer: MEDICARE

## 2022-06-16 VITALS
WEIGHT: 118.8 LBS | DIASTOLIC BLOOD PRESSURE: 76 MMHG | HEART RATE: 67 BPM | RESPIRATION RATE: 20 BRPM | SYSTOLIC BLOOD PRESSURE: 168 MMHG | OXYGEN SATURATION: 94 % | HEIGHT: 62 IN | BODY MASS INDEX: 21.86 KG/M2 | TEMPERATURE: 98.6 F

## 2022-06-16 PROCEDURE — 97110 THERAPEUTIC EXERCISES: CPT | Mod: GP

## 2022-06-16 PROCEDURE — 99217 PR OBSERVATION CARE DISCHARGE: CPT | Performed by: EMERGENCY MEDICINE

## 2022-06-16 PROCEDURE — 97116 GAIT TRAINING THERAPY: CPT | Mod: GP

## 2022-06-16 PROCEDURE — G0378 HOSPITAL OBSERVATION PER HR: HCPCS

## 2022-06-16 PROCEDURE — 250N000013 HC RX MED GY IP 250 OP 250 PS 637: Performed by: EMERGENCY MEDICINE

## 2022-06-16 PROCEDURE — 250N000011 HC RX IP 250 OP 636: Performed by: EMERGENCY MEDICINE

## 2022-06-16 PROCEDURE — 96372 THER/PROPH/DIAG INJ SC/IM: CPT | Performed by: EMERGENCY MEDICINE

## 2022-06-16 RX ORDER — ONDANSETRON 4 MG/1
4 TABLET, ORALLY DISINTEGRATING ORAL EVERY 6 HOURS PRN
Qty: 10 TABLET | Refills: 0 | Status: SHIPPED | OUTPATIENT
Start: 2022-06-16

## 2022-06-16 RX ORDER — MECLIZINE HYDROCHLORIDE 25 MG/1
25 TABLET ORAL EVERY 6 HOURS PRN
Qty: 30 TABLET | Refills: 0 | Status: SHIPPED | OUTPATIENT
Start: 2022-06-16

## 2022-06-16 RX ADMIN — ACETAMINOPHEN 650 MG: 325 TABLET, FILM COATED ORAL at 07:51

## 2022-06-16 RX ADMIN — ENOXAPARIN SODIUM 40 MG: 40 INJECTION SUBCUTANEOUS at 09:15

## 2022-06-16 RX ADMIN — SERTRALINE HYDROCHLORIDE 200 MG: 100 TABLET ORAL at 07:51

## 2022-06-16 RX ADMIN — BUSPIRONE HYDROCHLORIDE 10 MG: 10 TABLET ORAL at 07:51

## 2022-06-16 NOTE — PLAN OF CARE
"  PRIMARY DIAGNOSIS: \"GENERIC\" NURSING  OUTPATIENT/OBSERVATION GOALS TO BE MET BEFORE DISCHARGE:  ADLs back to baseline: No    Activity and level of assistance: Up with standby assistance.    Pain status: Pain free.    Return to near baseline physical activity: No     Discharge Planner Nurse   Safe discharge environment identified: Yes  Barriers to discharge: Yes       Entered by: Geri Snow RN 06/15/2022 10:41 PM     Please review provider order for any additional goals.   Nurse to notify provider when observation goals have been met and patient is ready for discharge.Goal Outcome Evaluation:      Patient complained of headache 2/10, had tylenol prn, denies nausea, no dizziness, states she is a little wabbly, alert  and oriented, vitals stable.                "

## 2022-06-16 NOTE — PLAN OF CARE
Problem: Plan of Care - These are the overarching goals to be used throughout the patient stay.    Goal: Plan of Care Review/Shift Note  Description: The Plan of Care Review/Shift note should be completed every shift.  The Outcome Evaluation is a brief statement about your assessment that the patient is improving, declining, or no change.  This information will be displayed automatically on your shift note.  Outcome: Met   Goal Outcome Evaluation:        PRIMARY DIAGNOSIS: GENERALIZED WEAKNESS    OUTPATIENT/OBSERVATION GOALS TO BE MET BEFORE DISCHARGE  1. Orthostatic performed: No    2. Tolerating PO medications: Yes    3. Return to near baseline physical activity: Yes    4. Cleared for discharge by consultants (if involved): Yes    Discharge Planner Nurse   Safe discharge environment identified: Yes  Barriers to discharge: No       Entered by: Brina Ann RN 06/16/2022 9:04 AM     Please review provider order for any additional goals.   Nurse to notify provider when observation goals have been met and patient is ready for discharge.   Patient discharging home with spouse. Received rolling walker after PT eval for home use d/t unsteady. Denies dizziness or nausea today. Has been OOB independent/SBA. Good appetite. Discharge instructions reviewed and given.

## 2022-06-16 NOTE — DISCHARGE SUMMARY
[facility] MEDICINE  DISCHARGE SUMMARY     Primary Care Physician: Izzy Corado  Admission Date: 6/14/2022   Discharge Provider: Kevin Garay MD Discharge Date: 6/16/2022   Diet:   Active Diet and Nourishment Order   Procedures     Combination Diet Regular Diet Adult     Diet       Code Status: Full Code   Activity: DCACTIVITY: Activity as tolerated        Condition at Discharge: Good     REASON FOR PRESENTATION(See Admission Note for Details)   Vertigo    PRINCIPAL & ACTIVE DISCHARGE DIAGNOSES     Active Problems:    Hypocalcemia    Hyponatremia    Vertigo      PENDING LABS     Unresulted Labs Ordered in the Past 30 Days of this Admission     No orders found from 5/15/2022 to 6/15/2022.            PROCEDURES ( this hospitalization only)          RECOMMENDATIONS TO OUTPATIENT PROVIDER FOR F/U VISIT     Follow-up Appointments     Follow-up and recommended labs and tests       Follow-up with primary provider within a week.  Outpatient PT referral   for vestibular therapy and may need ENT referral.             {Additional follow-up instructions/to-do's for PCP    : Monitor vertigo and arrange any needed follow-up including possible ENT referral.  Outpatient PT referral for vestibular therapy placed    DISPOSITION     Home    SUMMARY OF HOSPITAL COURSE:      Matilde is a 77-year-old female admitted with vertigo.  Was at a point where she could not ambulate and was admitted to the hospital.  She is much improved and is able to ambulate this morning with no vertigo but does feel unsteady.  She was seen by PT and outpatient vestibular therapy recommended.  Her symptoms have been worse at times with head movements but she has no vertigo this morning.  She had some ear fullness on admission but her ear exam was normal.  She had some mild frontal headache after admission which she felt was secondary to lack of caffeine.  She has had no nasal congestion or sinus pressure and her left  ear fullness has resolved.  No current indication for antibiotics.  If symptoms persist may consider steroids for inflammation although with her history of anxiety this decision would need to be carefully weighed..  She will need timely follow-up with her primary provider to assume care.  She is much improved this morning and stable for discharge.  She feels the meclizine has been helpful.    Discharge Medications with Med changes:     Current Discharge Medication List      START taking these medications    Details   meclizine (ANTIVERT) 25 MG tablet Take 1 tablet (25 mg) by mouth every 6 hours as needed for dizziness  Qty: 30 tablet, Refills: 0    Associated Diagnoses: Vertigo      ondansetron (ZOFRAN ODT) 4 MG ODT tab Take 1 tablet (4 mg) by mouth every 6 hours as needed for nausea or vomiting  Qty: 10 tablet, Refills: 0    Associated Diagnoses: Vertigo         CONTINUE these medications which have NOT CHANGED    Details   acetaminophen (TYLENOL) 325 MG tablet Take 650 mg by mouth every 6 hours as needed for mild pain      atorvastatin (LIPITOR) 40 MG tablet Take 40 mg by mouth At Bedtime      busPIRone (BUSPAR) 5 MG tablet Take 10 mg by mouth 2 times daily      CALCIUM-CARB 600 + D OR Take 1 tablet by mouth daily      polyethylene glycol (MIRALAX) 17 g packet Take 1 packet by mouth daily Takes mid-afternoon      sertraline (ZOLOFT) 100 MG tablet Take 200 mg by mouth daily                   Rationale for medication changes:      See summary        Consults       PHYSICAL THERAPY ADULT IP CONSULT  CARE MANAGEMENT / SOCIAL WORK IP CONSULT    Immunizations given this encounter     Most Recent Immunizations   Administered Date(s) Administered     COVID-19,PF,Pfizer 12+ Yrs (2022 and After) 04/19/2022     TDAP Vaccine (Adacel) 11/16/2012           Anticoagulation Information      Recent INR results: No results for input(s): INR in the last 168 hours.  Warfarin doses (if applicable) or name of other anticoagulant:        SIGNIFICANT IMAGING FINDINGS     Results for orders placed or performed during the hospital encounter of 06/14/22   MR Brain w/o Contrast    Impression    IMPRESSION:  1.  No acute intracranial process.  2.  Mild sequelae of chronic microvascular ischemic disease, similar to the prior exam.       SIGNIFICANT LABORATORY FINDINGS     Most Recent 3 CBC's:Recent Labs   Lab Test 06/15/22  0129 02/11/15  1235   WBC 6.7 9.1   HGB 13.4 14.5   MCV 87 88    211           Discharge Orders        Physical Therapy Referral      Reason for your hospital stay    Peripheral vertigo     Follow-up and recommended labs and tests     Follow-up with primary provider within a week.  Outpatient PT referral for vestibular therapy and may need ENT referral.     Activity    Your activity upon discharge: activity as tolerated     Walker    DME Documentation:   Describe the reason for need to support medical necessity: Vertigo    I, the undersigned, certify that the above prescribed supplies are medically necessary for this patient and is both reasonable and necessary in reference to accepted standards of medical and necessary in reference to accepted standards of medical practice in the treatment of this patient's condition and is not prescribed as a convenience.     Diet    Follow this diet upon discharge: Orders Placed This Encounter      Combination Diet Regular Diet Adult       Examination   Physical Exam   Temp:  [97.8  F (36.6  C)-98.8  F (37.1  C)] 98.8  F (37.1  C)  Pulse:  [58-77] 58  Resp:  [17-20] 20  BP: (126-154)/(59-75) 133/64  SpO2:  [89 %-97 %] 89 %  Wt Readings from Last 1 Encounters:   06/15/22 53.9 kg (118 lb 12.8 oz)       General: No apparent distress, walking around the room with no vertigo but does feel unsteady on her legs.  Heart: Regular rate and rhythm no murmur  Lungs: Clear to auscultation  Abdomen: Soft nontender      Please see EMR for more detailed significant labs, imaging, consultant notes  etc.    I, Kevin Garay MD, personally saw the patient today and spent greater than 30 minutes discharging this patient.    Kevin Garay MD  [facility]    CC:Izzy Corado

## 2022-06-16 NOTE — PLAN OF CARE
"PRIMARY DIAGNOSIS: \"GENERIC\" NURSING  OUTPATIENT/OBSERVATION GOALS TO BE MET BEFORE DISCHARGE:  1. ADLs back to baseline: No    2. Activity and level of assistance: Up with standby assistance.    3. Pain status: Pain free.    4. Return to near baseline physical activity: No     Discharge Planner Nurse   Safe discharge environment identified: Yes  Barriers to discharge: Yes        Entered by: Dandy Smallwood RN 06/16/2022 4:11 AM     Please review provider order for any additional goals.   Nurse to notify provider when observation goals have been met and patient is ready for discharge.  "

## 2022-06-16 NOTE — PLAN OF CARE
Physical Therapy Discharge Summary    Reason for therapy discharge:    Discharged to home with outpatient therapy.    Progress towards therapy goal(s). See goals on Care Plan in [facility] electronic health record for goal details.  Goals met    Therapy recommendation(s):    Continued therapy is recommended.  Rationale/Recommendations:  outpatient PT as needed.

## 2022-06-24 ENCOUNTER — HOSPITAL ENCOUNTER (OUTPATIENT)
Dept: PHYSICAL THERAPY | Facility: REHABILITATION | Age: 77
Discharge: HOME | End: 2022-06-24
Attending: EMERGENCY MEDICINE
Payer: MEDICARE

## 2022-06-24 DIAGNOSIS — R42 VERTIGO: ICD-10-CM

## 2022-06-24 PROCEDURE — 97161 PT EVAL LOW COMPLEX 20 MIN: CPT | Mod: GP | Performed by: PHYSICAL THERAPIST

## 2022-06-24 PROCEDURE — 97112 NEUROMUSCULAR REEDUCATION: CPT | Mod: GP | Performed by: PHYSICAL THERAPIST

## 2022-06-24 NOTE — PROGRESS NOTES
[facility]                                                                                   OUTPATIENT PHYSICAL THERAPY FUNCTIONAL EVALUATION  PLAN OF TREATMENT FOR OUTPATIENT REHABILITATION  (COMPLETE FOR INITIAL CLAIMS ONLY)  Patient's Last Name, First Name, M.I.  YOB: 1945  Carisa Rosado     Provider's Name   [facility]   Medical Record No.  [id number]     Start of Care Date:  06/24/22   Onset Date:      Type:     _X__PT   ____OT  ____SLP Medical Diagnosis:  (P) Vertigo     PT Diagnosis:  (P) Dizziness; Balance Impairment Visits from SOC:  1                              __________________________________________________________________________________  Plan of Treatment/Functional Goals:  (P) balance training, gait training, motor coordination training, neuromuscular re-education, ROM, strengthening, stretching (Canalith Repositioning Procedure)           GOALS     (P) Pt will be independent with her HEP for ongoing symptom management in 12 weeks.          (P) Pt will score 21/24 on the DGI to be a low fall risk in 12 weeks.          (P) Pt will report no dizziness with walking, bed mobility or quick turns in 12 weeks.                Therapy Frequency:  (P) 1 time/week (1xwk to every 1-3 weeks)   Predicted Duration of Therapy Intervention:  (P) 8 visits over 12 weeks    Keira Wilson, PT                                    I CERTIFY THE NEED FOR THESE SERVICES FURNISHED UNDER        THIS PLAN OF TREATMENT AND WHILE UNDER MY CARE     (Physician co-signature of this document indicates review and certification of the therapy plan).              Certification Date From:  (P) 06/24/22   Certification Date To:  (P) 09/16/22    Referring Provider:  Dr. Kevin Garay    Initial Assessment  See Epic Evaluation- Start of Care Date: 06/24/22 06/24/22 1300  "  Quick Adds   Quick Adds Certification;Vestibular Eval   Type of Visit Initial OP PT Evaluation   General Information   Start of Care Date 06/24/22   Referring Physician Dr. Kevin Garay   Orders Evaluate and Treat as Indicated   Order Date 06/16/22   Medical Diagnosis Vertigo   Pertinent history of current problem (include personal factors and/or comorbidities that impact the POC) Pt was laying flat and doing yoga exercises.  When she got up, she was spinning.  She took a shower and got into bed and was still dizzy but was nauseous.  Pt notes that bending over can also make her feel dizzy.  Pt feels discouarged because this has impacted her active lifestyle.  Pt did go to the ER that night via ambulance.  Pt was issued a walker by PT and discharged home.  Pt given meclazine and took her last dose this AM.  Currently, pt feels like she is no longer dizzy, like spinning. However, the pt does feel off kilter and wobbly. \"My balance isn't there anymore.\"   Prior level of function comment Pt is very active:  walks 5 miles/day, gardening, yoga   Current Community Support Family/friend caregiver   Patient role/Employment history Retired   Living environment [place]   Home/Community Accessibility Comments Pt's home has stairs and denies any difficulty but notes, \"I'm very careful\"   Assistive Devices Comments Issued a FWW with current ER stay   Fall Risk Screen   Fall screen completed by PT   Have you fallen 2 or more times in the past year? No   Have you fallen and had an injury in the past year? No   Is patient a fall risk? No   Abuse Screen (yes response referral indicated)   Feels Unsafe at Home or Work/School no   Feels Threatened by Someone no   Does Anyone Try to Keep You From Having Contact with Others or Doing Things Outside Your Home? no   Physical Signs of Abuse Present no   Patient needs abuse support services and resources No   Cognitive Status Examination   Orientation orientation to person, place " and time   Level of Consciousness alert   Follows Commands and Answers Questions 100% of the time   Personal Safety and Judgment intact   Memory intact   Balance Special Tests   Balance Special Tests Modified CTSIB Conditions   Balance Special Tests Modified CTSIB Conditions   Condition 1, seconds 30 Seconds   Condition 2, seconds 30 Seconds   Condition 4, seconds 30 Seconds   Condition 5, seconds 5 Seconds   Cervicogenic Screen   Neck ROM WNL   Vertebral Artery Test Normal   Alar Ligament Test Normal   Transverse Ligament Test Normal   Oculomotor Exam   Smooth Pursuit Normal   Saccades Normal   VOR Normal   VOR Cancellation Normal   Rapid Head Thrust Normal   Infrared Goggle Exam or Frenzel Lense Exam   Vestibular Suppressant in Last 24 Hours? Yes  (Meclazine this AM)   Exam completed with Infrared Goggles   Spontaneous Nystagmus Negative   Gaze Evoked Nystagmus Horizontal L  (with L eye gaze)   Reliance-Hallpike (right) Negative   Reliance-Hallpike (Left) Negative   HSCC Supine Roll Test (Right) Negative   HSCC Supine Roll Test (Left) Negative   Planned Therapy Interventions   Planned Therapy Interventions balance training;gait training;motor coordination training;neuromuscular re-education;ROM;strengthening;stretching  (Canalith Repositioning Procedure)   Clinical Impression   Criteria for Skilled Therapeutic Interventions Met yes, treatment indicated   PT Diagnosis Dizziness; Balance Impairment   Influenced by the following impairments Dizziness; Balance Impairment   Functional limitations due to impairments Dizziness; Balance Impairment   Clinical Presentation Stable/Uncomplicated   Clinical Decision Making (Complexity) Low complexity   Therapy Frequency 1 time/week  (1xwk to every 1-3 weeks)   Predicted Duration of Therapy Intervention (days/wks) 8 visits over 12 weeks   Risk & Benefits of therapy have been explained Yes   Patient, Family & other staff in agreement with plan of care Yes   Clinical Impression Comments Pt  is a 76 yo female with recent sudden onset of dizziness.  Signs and symptoms consistent with BPPV however pt took meclazine this date (10:30am) and no nystagmus or c/o dizziness noted with positional testing.  BPPV may have resolved spontaneously but will reassess at next visit.  Pt does appear to have an vestibular dysfunction as balance is impaired, especially with EC on foam.  Pt would benefit from skilled 1:1 PT to address her dizziness and balance impairments.   Education Assessment   Barriers to Learning No barriers   GOALS   PT Eval Goals 1;2;3   Goal 1   Goal Description Pt will be independent with her HEP for ongoing symptom management in 12 weeks.   Goal 2   Goal Description Pt will score 21/24 on the DGI to be a low fall risk in 12 weeks.   Goal 3   Goal Description Pt will report no dizziness with walking, bed mobility or quick turns in 12 weeks.   Total Evaluation Time   PT Eval, Low Complexity Minutes (03432) 20   Therapy Certification   Certification date from 06/24/22   Certification date to 09/16/22   Medical Diagnosis Vertigo   Certification I certify the need for these services furnished under this plan of treatment and while under my care.  (Physician co-signature of this document indicates review and certification of the therapy plan).

## 2022-07-28 ENCOUNTER — HOSPITAL ENCOUNTER (OUTPATIENT)
Dept: PHYSICAL THERAPY | Facility: REHABILITATION | Age: 77
Discharge: HOME | End: 2022-07-28
Payer: MEDICARE

## 2022-07-28 PROCEDURE — 97112 NEUROMUSCULAR REEDUCATION: CPT | Mod: GP | Performed by: PHYSICAL THERAPIST

## 2022-07-28 PROCEDURE — 95992 CANALITH REPOSITIONING PROC: CPT | Mod: GP | Performed by: PHYSICAL THERAPIST

## 2022-07-28 PROCEDURE — 97750 PHYSICAL PERFORMANCE TEST: CPT | Mod: GP | Performed by: PHYSICAL THERAPIST

## 2022-08-05 ENCOUNTER — HOSPITAL ENCOUNTER (OUTPATIENT)
Dept: PHYSICAL THERAPY | Facility: REHABILITATION | Age: 77
Discharge: HOME | End: 2022-08-05
Payer: MEDICARE

## 2022-08-05 PROCEDURE — 95992 CANALITH REPOSITIONING PROC: CPT | Mod: GP | Performed by: PHYSICAL THERAPIST

## 2022-08-11 ENCOUNTER — HOSPITAL ENCOUNTER (OUTPATIENT)
Dept: PHYSICAL THERAPY | Facility: REHABILITATION | Age: 77
Discharge: HOME | End: 2022-08-11
Payer: MEDICARE

## 2022-08-11 PROCEDURE — 97750 PHYSICAL PERFORMANCE TEST: CPT | Mod: GP | Performed by: PHYSICAL THERAPIST

## 2025-05-27 ENCOUNTER — TRANSCRIBE ORDERS (OUTPATIENT)
Dept: OTHER | Age: 80
End: 2025-05-27

## 2025-05-27 DIAGNOSIS — A04.71 RECURRENT CLOSTRIDIUM DIFFICILE DIARRHEA: Primary | ICD-10-CM

## 2025-05-28 ENCOUNTER — TELEPHONE (OUTPATIENT)
Dept: GASTROENTEROLOGY | Facility: CLINIC | Age: 80
End: 2025-05-28
Payer: MEDICARE

## 2025-05-28 NOTE — TELEPHONE ENCOUNTER
"Due to location we have to offer, patient requests the referral sent to [facility], referral has been faxed      Endoscopy Scheduling Screen    Caller: patient    Have you had any respiratory illness or flu-like symptoms in the last 10 days?  No    What is your communication preference for Instructions and/or Bowel Prep?   Mail/USPS    What insurance is in the chart?  Other:  Medicare    Ordering/Referring Provider: WEGENER, BRITA L   (If ordering provider performs procedure, schedule with ordering provider unless otherwise instructed. )    BMI: Estimated body mass index is 21.73 kg/m  as calculated from the following:    Height as of 6/15/22: 1.575 m (5' 2\").    Weight as of 6/15/22: 53.9 kg (118 lb 12.8 oz).     Sedation Ordered  moderate sedation.   If patient BMI > 50 do not schedule in [facility].    If patient BMI > 45 do not schedule at [facility].    Are you taking methadone or Suboxone?  NO, No RN review required.    Have you been diagnosed and are being treated for severe PTSD or severe anxiety?  NO, No RN review required.    Are you taking any prescription medications for pain 3 or more times per week?   NO, No RN review required.    Do you have a history of malignant hyperthermia?  No    (Females) Are you currently pregnant?   No     Have you been diagnosed or told you have pulmonary hypertension?   No    Do you have an LVAD?  No    Have you been told you have moderate to severe sleep apnea?  No.    Have you been told you have COPD, asthma, or any other lung disease?  No    Has your doctor ordered any cardiac tests like echo, angiogram, stress test, ablation, or EKG, that you have not completed yet?  No    Do you  have a history of any heart conditions?  No     Have you ever had or are you waiting for an organ transplant?  No. Continue scheduling, no site restrictions.    Have you had a stroke or transient ischemic attack (TIA aka \"mini stroke\") in the last 2 years?   No.    Have you been diagnosed with or been told you " have cirrhosis of the liver?   No.    Are you currently on dialysis?   No    Do you need assistance transferring?   No    BMI: 20.1 (Patient reported)  Height~5'2  Weight~110 LB    Is patients BMI > 40 and scheduling location [facility]?  No    Do you take an injectable or oral medication for weight loss or diabetes (excluding insulin)?  No    Do you take the medication Naltrexone?  No    Do you take blood thinners?  No       Prep   Are you currently on dialysis or do you have chronic kidney disease?  No    Do you have a diagnosis of diabetes?  No    Do you have a diagnosis of cystic fibrosis (CF)?  No    On a regular basis do you go 3 -5 days between bowel movements?  Yes (Extended Prep)    BMI > 40?  No    Preferred Pharmacy:    [facility]/pharmacy #[id number] - [address] [address]  Phone: [phone] Fax: [phone]